# Patient Record
Sex: FEMALE | Race: WHITE | NOT HISPANIC OR LATINO | Employment: OTHER | URBAN - METROPOLITAN AREA
[De-identification: names, ages, dates, MRNs, and addresses within clinical notes are randomized per-mention and may not be internally consistent; named-entity substitution may affect disease eponyms.]

---

## 2017-02-09 ENCOUNTER — TELEPHONE (OUTPATIENT)
Dept: FAMILY MEDICINE | Facility: CLINIC | Age: 82
End: 2017-02-09

## 2017-02-09 DIAGNOSIS — E87.1 HYPONATREMIA: ICD-10-CM

## 2017-02-09 DIAGNOSIS — M81.0 OSTEOPOROSIS: Primary | ICD-10-CM

## 2017-02-09 NOTE — TELEPHONE ENCOUNTER
----- Message from Lalita Conway RN sent at 2/7/2017  2:45 PM CST -----  Regarding: Prolia  If you would like this pt to receive Prolia, please set up a CMP and reorder Prolia, so a referral will be created, and I will schedule pt after making sure her Ca level is WNL.    Let me know.    Thanks,  Lalita Valdovinos

## 2017-02-24 ENCOUNTER — DOCUMENTATION ONLY (OUTPATIENT)
Dept: INFUSION THERAPY | Facility: HOSPITAL | Age: 82
End: 2017-02-24

## 2017-02-24 ENCOUNTER — TELEPHONE (OUTPATIENT)
Dept: FAMILY MEDICINE | Facility: CLINIC | Age: 82
End: 2017-02-24

## 2017-02-24 NOTE — TELEPHONE ENCOUNTER
Called patient to schedule labs so she can receive prolia. She stated she will call back once she speaks with her daughters in order to see which date they are available to bring her

## 2017-02-24 NOTE — PROGRESS NOTES
Pt did not show for her lab appt, per Melissa Paz in MD office. She rescheduled pt's labs for 2/27/2017. If Ca level is WNL will call to schedule Prolia.

## 2017-03-01 ENCOUNTER — LAB VISIT (OUTPATIENT)
Dept: LAB | Facility: HOSPITAL | Age: 82
End: 2017-03-01
Attending: FAMILY MEDICINE
Payer: MEDICARE

## 2017-03-01 ENCOUNTER — TELEPHONE (OUTPATIENT)
Dept: FAMILY MEDICINE | Facility: CLINIC | Age: 82
End: 2017-03-01

## 2017-03-01 DIAGNOSIS — E87.1 HYPONATREMIA: ICD-10-CM

## 2017-03-01 DIAGNOSIS — M81.0 OSTEOPOROSIS: ICD-10-CM

## 2017-03-01 LAB
ALBUMIN SERPL BCP-MCNC: 2.6 G/DL
ALP SERPL-CCNC: 86 U/L
ALT SERPL W/O P-5'-P-CCNC: 29 U/L
ANION GAP SERPL CALC-SCNC: 10 MMOL/L
AST SERPL-CCNC: 37 U/L
BILIRUB SERPL-MCNC: 0.3 MG/DL
BUN SERPL-MCNC: 13 MG/DL
CALCIUM SERPL-MCNC: 9.6 MG/DL
CHLORIDE SERPL-SCNC: 98 MMOL/L
CO2 SERPL-SCNC: 25 MMOL/L
CREAT SERPL-MCNC: 0.8 MG/DL
EST. GFR  (AFRICAN AMERICAN): >60 ML/MIN/1.73 M^2
EST. GFR  (NON AFRICAN AMERICAN): >60 ML/MIN/1.73 M^2
GLUCOSE SERPL-MCNC: 105 MG/DL
POTASSIUM SERPL-SCNC: 4.3 MMOL/L
PROT SERPL-MCNC: 7.6 G/DL
SODIUM SERPL-SCNC: 133 MMOL/L

## 2017-03-01 PROCEDURE — 36415 COLL VENOUS BLD VENIPUNCTURE: CPT

## 2017-03-01 PROCEDURE — 80053 COMPREHEN METABOLIC PANEL: CPT

## 2017-03-01 NOTE — TELEPHONE ENCOUNTER
----- Message from Irma Soto sent at 3/1/2017 10:15 AM CST -----  Contact: Self [phone] or [phone]  Calling to get Lab orders. Please advice

## 2017-03-07 ENCOUNTER — TELEPHONE (OUTPATIENT)
Dept: INFUSION THERAPY | Facility: HOSPITAL | Age: 82
End: 2017-03-07

## 2017-03-10 ENCOUNTER — OFFICE VISIT (OUTPATIENT)
Dept: FAMILY MEDICINE | Facility: CLINIC | Age: 82
End: 2017-03-10
Payer: MEDICARE

## 2017-03-10 VITALS
BODY MASS INDEX: 22.3 KG/M2 | SYSTOLIC BLOOD PRESSURE: 98 MMHG | DIASTOLIC BLOOD PRESSURE: 68 MMHG | TEMPERATURE: 98 F | WEIGHT: 114.19 LBS | RESPIRATION RATE: 18 BRPM | OXYGEN SATURATION: 99 % | HEART RATE: 75 BPM

## 2017-03-10 DIAGNOSIS — R39.89 URINE TROUBLES: ICD-10-CM

## 2017-03-10 DIAGNOSIS — N39.0 URINARY TRACT INFECTION WITHOUT HEMATURIA, SITE UNSPECIFIED: Primary | ICD-10-CM

## 2017-03-10 LAB
BILIRUB SERPL-MCNC: NEGATIVE MG/DL
BLOOD, POC UA: NEGATIVE
GLUCOSE UR QL STRIP: NEGATIVE
KETONES UR QL STRIP: NEGATIVE
LEUKOCYTE ESTERASE URINE, POC: ABNORMAL
NITRITE, POC UA: POSITIVE
PH, POC UA: 6
PROTEIN, POC: 30
SPECIFIC GRAVITY, POC UA: >=1.03
UROBILINOGEN, POC UA: 0.2

## 2017-03-10 PROCEDURE — 1125F AMNT PAIN NOTED PAIN PRSNT: CPT | Mod: S$GLB,,, | Performed by: FAMILY MEDICINE

## 2017-03-10 PROCEDURE — 99999 PR PBB SHADOW E&M-EST. PATIENT-LVL III: CPT | Mod: PBBFAC,,, | Performed by: FAMILY MEDICINE

## 2017-03-10 PROCEDURE — 1159F MED LIST DOCD IN RCRD: CPT | Mod: S$GLB,,, | Performed by: FAMILY MEDICINE

## 2017-03-10 PROCEDURE — 81000 URINALYSIS NONAUTO W/SCOPE: CPT | Mod: S$GLB,,, | Performed by: FAMILY MEDICINE

## 2017-03-10 PROCEDURE — 1160F RVW MEDS BY RX/DR IN RCRD: CPT | Mod: S$GLB,,, | Performed by: FAMILY MEDICINE

## 2017-03-10 PROCEDURE — 87086 URINE CULTURE/COLONY COUNT: CPT

## 2017-03-10 PROCEDURE — 1157F ADVNC CARE PLAN IN RCRD: CPT | Mod: S$GLB,,, | Performed by: FAMILY MEDICINE

## 2017-03-10 PROCEDURE — 99214 OFFICE O/P EST MOD 30 MIN: CPT | Mod: 25,S$GLB,, | Performed by: FAMILY MEDICINE

## 2017-03-10 RX ORDER — NITROFURANTOIN (MACROCRYSTALS) 100 MG/1
100 CAPSULE ORAL EVERY 12 HOURS
Qty: 20 CAPSULE | Refills: 0 | Status: SHIPPED | OUTPATIENT
Start: 2017-03-10 | End: 2017-03-20

## 2017-03-10 NOTE — MR AVS SNAPSHOT
[address]  [place] [address]  Phone: [phone]  Fax: [phone]                  Lisa Olguin   3/10/2017 9:40 AM   Office Visit    Description:  Female : 1922   Provider:  Amber Faustin MD   Department:  [facility]           Reason for Visit     Hematuria                To Do List           Future Appointments        Provider Department Dept Phone    3/24/2017 4:00 PM Amber Faustin MD [facility] [phone]      Goals (5 Years of Data)     None      [facility]     [facility] On [facility] Care Line -  Assistance  Registered nurses in the [facility] On Call Center provide clinical advisement, health education, appointment booking, and other advisory services.  Call for this free service at [phone].             Medications           Message regarding Medications     Verify the changes and/or additions to your medication regime listed below are the same as discussed with your clinician today.  If any of these changes or additions are incorrect, please notify your healthcare provider.             Verify that the below list of medications is an accurate representation of the medications you are currently taking.  If none reported, the list may be blank. If incorrect, please contact your healthcare provider. Carry this list with you in case of emergency.           Current Medications     amlodipine (NORVASC) 10 MG tablet Take 1 tablet (10 mg total) by mouth once daily.    carvedilol (COREG) 3.125 MG tablet Take 1 tablet (3.125 mg total) by mouth 2 (two) times daily.    levothyroxine (SYNTHROID) 75 MCG tablet TAKE 1 TABLET ONE TIME DAILY    lisinopril (PRINIVIL,ZESTRIL) 40 MG tablet Take 1 tablet (40 mg total) by mouth once daily.    lovastatin (MEVACOR) 40 MG tablet Take 1 tablet (40 mg total) by mouth nightly.           Clinical Reference Information           Your Vitals Were     BP Pulse Temp Resp Weight  SpO2    98/68 (BP Location: Left arm, Patient Position: Sitting, BP Method: Manual) 75 97.8 °F (36.6 °C) (Oral) 18 51.8 kg (114 lb 3.2 oz) 99%    BMI                22.3 kg/m2          Blood Pressure          Most Recent Value    BP  98/68      Allergies as of 3/10/2017     Fosamax [Alendronate]    Neosporin [Benzalkonium Chloride]    Pcn [Penicillins]    Sulfa (Sulfonamide Antibiotics)    Etodolac      Immunizations Administered on Date of Encounter - 3/10/2017     None      [facility] Sign-Up     Activating your [facility] account is as easy as 1-2-3!     1) Visit [email or web address], select Sign Up Now, enter this activation code and your date of birth, then select Next.  [email or web address]  Expires: 4/24/2017 10:45 AM      2) Create a username and password to use when you visit [facility] in the future and select a security question in case you lose your password and select Next.    3) Enter your e-mail address and click Sign Up!    Additional Information  If you have questions, please e-mail [email or web address] or call [phone] to talk to our [facility] staff. Remember, [facility] is NOT to be used for urgent needs. For medical emergencies, dial 911.         Language Assistance Services     ATTENTION: Language assistance services are available, free of charge. Please call [phone].      ATENCIÓN: Si habla español, tiene a roberson disposición servicios gratuitos de asistencia lingüística. Llame al [phone].     CHÚ Ý: N?u b?n nói Ti?ng Vi?t, có các d?ch v? h? tr? ngôn ng? mi?n phí dành cho b?n. G?i s? [phone].         [facility] complies with applicable Federal civil rights laws and does not discriminate on the basis of race, color, national origin, age, disability, or sex.

## 2017-03-12 LAB — BACTERIA UR CULT: NO GROWTH

## 2017-03-13 NOTE — PROGRESS NOTES
HPI:  Lisa Olguin is a 89 y.o. year old female that  presents with   Chief Complaint   Patient presents with    Hematuria     4 days   .     HPI      Past Medical History:   Diagnosis Date    Basal cell carcinoma 2012    nose    HTN (hypertension)     Hyperlipidemia     Inflammatory arthritis     Joint pain     Osteoarthritis     Osteoporosis, unspecified     Polymyalgia rheumatica     followed by rheumatology     Social History     Social History    Marital status:      Spouse name: N/A    Number of children: N/A    Years of education: N/A     Occupational History    Not on file.     Social History Main Topics    Smoking status: Former Smoker    Smokeless tobacco: Not on file    Alcohol use Yes      Comment: occasional    Drug use: No    Sexual activity: No     Other Topics Concern    Are You Pregnant Or Think You May Be? No    Breast-Feeding No     Social History Narrative     Past Surgical History:   Procedure Laterality Date    TONSILLECTOMY       Family History   Problem Relation Age of Onset    Hypertension Mother     Heart failure Mother     Heart attack Father     Cancer Sister     Diabetes Sister     Diabetes Brother     Heart disease Brother     Hypertension Daughter     Hypertension Son     Hyperlipidemia Son     Cancer Sister     Lung cancer Sister     Hypertension Daughter     Hyperlipidemia Daughter     Hypertension Daughter     Hypertension Daughter     Melanoma Neg Hx            Review of Systems  General ROS: negative for chills, fever or weight loss  ENT ROS: negative for epistaxis, sore throat or rhinorrhea  Respiratory ROS: no cough, shortness of breath, or wheezing  Cardiovascular ROS: no chest pain or dyspnea on exertion  Gastrointestinal ROS: no abdominal pain, change in bowel habits, or black/ bloody stools    Physical Exam:  BP 98/68 (BP Location: Left arm, Patient Position: Sitting, BP Method: Manual)  Pulse 75  Temp 97.8 °F (36.6 °C)  (Oral)   Resp 18  Wt 51.8 kg (114 lb 3.2 oz)  SpO2 99%  BMI 22.3 kg/m2  General appearance: alert, cooperative, no distress  Constitutional:Oriented to person, place, and time.appears well-developed and well-nourished.  HEENT: Normocephalic, atraumatic, neck symmetrical, no nasal discharge, TM- clear bilaterally  Lungs: clear to auscultation bilaterally, no dullness to percussion bilaterally  Heart: regular rate and rhythm without rub; no displacement of the PMI , S1&S2 present  Abdomen: soft, non-tender; bowel sounds normoactive; no organomegaly      LABS:    Complete Blood Count  Lab Results   Component Value Date    RBC 4.25 07/14/2016    HGB 12.9 07/14/2016    HCT 38.4 07/14/2016    MCV 90 07/14/2016    MCH 30.4 07/14/2016    MCHC 33.6 07/14/2016    RDW 12.8 07/14/2016     07/14/2016    MPV 10.1 07/14/2016    GRAN 4.2 07/14/2016    GRAN 57.4 07/14/2016    LYMPH 2.2 07/14/2016    LYMPH 30.2 07/14/2016    MONO 0.7 07/14/2016    MONO 9.0 07/14/2016    EOS 0.2 07/14/2016    BASO 0.04 07/14/2016    EOSINOPHIL 2.2 07/14/2016    BASOPHIL 0.6 07/14/2016    DIFFMETHOD Automated 07/14/2016       Comprehensive Metabolic Panel  Lab Results   Component Value Date     03/01/2017    BUN 13 03/01/2017    CREATININE 0.8 03/01/2017     (L) 03/01/2017    K 4.3 03/01/2017    CL 98 03/01/2017    PROT 7.6 03/01/2017    ALBUMIN 2.6 (L) 03/01/2017    BILITOT 0.3 03/01/2017    AST 37 03/01/2017    ALKPHOS 86 03/01/2017    CO2 25 03/01/2017    ALT 29 03/01/2017    ANIONGAP 10 03/01/2017    EGFRNONAA >60 03/01/2017    ESTGFRAFRICA >60 03/01/2017       TSH  Lab Results   Component Value Date    TSH 1.882 07/08/2016         Assessment:    ICD-10-CM ICD-9-CM    1. Urinary tract infection without hematuria, site unspecified N39.0 599.0 nitrofurantoin (MACRODANTIN) 100 MG capsule      CULTURE, URINE      CULTURE, URINE   2. Urine troubles R39.89 V47.4 POCT URINALYSIS         Plan:    No Follow-up on  file.          Amber Faustin MD

## 2017-03-16 ENCOUNTER — TELEPHONE (OUTPATIENT)
Dept: FAMILY MEDICINE | Facility: CLINIC | Age: 82
End: 2017-03-16

## 2017-03-16 NOTE — TELEPHONE ENCOUNTER
----- Message from Krissy Whiting sent at 3/16/2017  9:34 AM CDT -----  Contact: ms mosqueda with the  [facility] [phone]  Please call mr. Mosqueda regarding patient

## 2017-03-21 ENCOUNTER — TELEPHONE (OUTPATIENT)
Dept: FAMILY MEDICINE | Facility: CLINIC | Age: 82
End: 2017-03-21

## 2017-03-21 NOTE — TELEPHONE ENCOUNTER
----- Message from Ida Flowers sent at 3/21/2017  8:45 AM CDT -----  Contact: [facility]   [phone]  She states the pt urine spelled from the cup so they wont be able to have results.  Tried calling ext and IM message no answer   Please advise

## 2017-03-21 NOTE — TELEPHONE ENCOUNTER
Urine was sent to lab on 3/10. 3/21 Mirtha from [facility] labs states urine was spilled and patient needs to redo lab specimen. No IM message or missed call from call center or Mirtha at [facility].

## 2017-03-24 ENCOUNTER — TELEPHONE (OUTPATIENT)
Dept: FAMILY MEDICINE | Facility: CLINIC | Age: 82
End: 2017-03-24

## 2017-03-24 ENCOUNTER — OFFICE VISIT (OUTPATIENT)
Dept: FAMILY MEDICINE | Facility: CLINIC | Age: 82
End: 2017-03-24
Payer: MEDICARE

## 2017-03-24 VITALS
TEMPERATURE: 98 F | SYSTOLIC BLOOD PRESSURE: 162 MMHG | HEART RATE: 80 BPM | OXYGEN SATURATION: 99 % | RESPIRATION RATE: 18 BRPM | DIASTOLIC BLOOD PRESSURE: 68 MMHG | WEIGHT: 112.13 LBS | BODY MASS INDEX: 21.89 KG/M2

## 2017-03-24 DIAGNOSIS — E03.9 HYPOTHYROIDISM, UNSPECIFIED TYPE: ICD-10-CM

## 2017-03-24 DIAGNOSIS — R53.83 FATIGUE, UNSPECIFIED TYPE: ICD-10-CM

## 2017-03-24 DIAGNOSIS — R19.00 ABDOMINAL MASS, UNSPECIFIED LOCATION: ICD-10-CM

## 2017-03-24 DIAGNOSIS — R09.89 ABDOMINAL BRUIT: Primary | ICD-10-CM

## 2017-03-24 DIAGNOSIS — I10 ESSENTIAL HYPERTENSION: ICD-10-CM

## 2017-03-24 PROCEDURE — 99999 PR PBB SHADOW E&M-EST. PATIENT-LVL IV: CPT | Mod: PBBFAC,,, | Performed by: FAMILY MEDICINE

## 2017-03-24 PROCEDURE — 1157F ADVNC CARE PLAN IN RCRD: CPT | Mod: S$GLB,,, | Performed by: FAMILY MEDICINE

## 2017-03-24 PROCEDURE — 99499 UNLISTED E&M SERVICE: CPT | Mod: S$GLB,,, | Performed by: FAMILY MEDICINE

## 2017-03-24 PROCEDURE — 99215 OFFICE O/P EST HI 40 MIN: CPT | Mod: S$GLB,,, | Performed by: FAMILY MEDICINE

## 2017-03-24 PROCEDURE — 1159F MED LIST DOCD IN RCRD: CPT | Mod: S$GLB,,, | Performed by: FAMILY MEDICINE

## 2017-03-24 PROCEDURE — 1125F AMNT PAIN NOTED PAIN PRSNT: CPT | Mod: S$GLB,,, | Performed by: FAMILY MEDICINE

## 2017-03-24 PROCEDURE — 1160F RVW MEDS BY RX/DR IN RCRD: CPT | Mod: S$GLB,,, | Performed by: FAMILY MEDICINE

## 2017-03-24 RX ORDER — HYDROCODONE BITARTRATE AND ACETAMINOPHEN 10; 325 MG/1; MG/1
1 TABLET ORAL EVERY 6 HOURS PRN
Qty: 90 TABLET | Refills: 0 | Status: SHIPPED | OUTPATIENT
Start: 2017-03-24 | End: 2017-04-12 | Stop reason: SDUPTHER

## 2017-03-24 NOTE — TELEPHONE ENCOUNTER
----- Message from Julissa Faustin sent at 3/24/2017  3:23 PM CDT -----  Contact: [phone]  Pt requesting the results of her testing/scan done at [facility]. Please advise.

## 2017-03-24 NOTE — MR AVS SNAPSHOT
[address]  [place] [address]  Phone: [phone]  Fax: [phone]                  Lisa Olguin   3/24/2017 8:40 AM   Office Visit    Description:  Female : 1922   Provider:  Amber Faustin MD   Department:  [facility]           Reason for Visit     Hip Pain     Fatigue                To Do List           Goals (5 Years of Data)     None      [facility]     [facility] On Call Nurse Care Line -  Assistance  Registered nurses in the [facility] On Call Center provide clinical advisement, health education, appointment booking, and other advisory services.  Call for this free service at [phone].             Medications           Message regarding Medications     Verify the changes and/or additions to your medication regime listed below are the same as discussed with your clinician today.  If any of these changes or additions are incorrect, please notify your healthcare provider.             Verify that the below list of medications is an accurate representation of the medications you are currently taking.  If none reported, the list may be blank. If incorrect, please contact your healthcare provider. Carry this list with you in case of emergency.           Current Medications     amlodipine (NORVASC) 10 MG tablet Take 1 tablet (10 mg total) by mouth once daily.    carvedilol (COREG) 3.125 MG tablet Take 1 tablet (3.125 mg total) by mouth 2 (two) times daily.    levothyroxine (SYNTHROID) 75 MCG tablet TAKE 1 TABLET ONE TIME DAILY    lisinopril (PRINIVIL,ZESTRIL) 40 MG tablet Take 1 tablet (40 mg total) by mouth once daily.    lovastatin (MEVACOR) 40 MG tablet Take 1 tablet (40 mg total) by mouth nightly.           Clinical Reference Information           Your Vitals Were     BP Pulse Temp Resp Weight SpO2    162/68 (BP Location: Left arm, Patient Position: Sitting, BP Method: Manual) 80 98 °F (36.7 °C) (Oral) 18 50.8 kg (112 lb 1.7 oz)  99%    BMI                21.89 kg/m2          Blood Pressure          Most Recent Value    BP  (!)  162/68      Allergies as of 3/24/2017     Fosamax [Alendronate]    Neosporin [Benzalkonium Chloride]    Pcn [Penicillins]    Sulfa (Sulfonamide Antibiotics)    Etodolac      Immunizations Administered on Date of Encounter - 3/24/2017     None      [facility] Sign-Up     Activating your [facility] account is as easy as 1-2-3!     1) Visit [email or web address], select Sign Up Now, enter this activation code and your date of birth, then select Next.  [email or web address]  Expires: 4/24/2017 11:45 AM      2) Create a username and password to use when you visit [facility] in the future and select a security question in case you lose your password and select Next.    3) Enter your e-mail address and click Sign Up!    Additional Information  If you have questions, please e-mail [email or web address] or call [phone] to talk to our [facility] staff. Remember, [facility] is NOT to be used for urgent needs. For medical emergencies, dial 911.         Language Assistance Services     ATTENTION: Language assistance services are available, free of charge. Please call [phone].      ATENCIÓN: Si habla español, tiene a roberson disposición servicios gratuitos de asistencia lingüística. Llame al [phone].     CHÚ Ý: N?u b?n nói Ti?ng Vi?t, có các d?ch v? h? tr? ngôn ng? mi?n phí dành cho b?n. G?i s? [phone].         [facility] complies with applicable Federal civil rights laws and does not discriminate on the basis of race, color, national origin, age, disability, or sex.

## 2017-03-27 ENCOUNTER — TELEPHONE (OUTPATIENT)
Dept: FAMILY MEDICINE | Facility: CLINIC | Age: 82
End: 2017-03-27

## 2017-03-27 NOTE — PROGRESS NOTES
HPI:  Lisa Olguin is a 89 y.o. year old female that  presents with continued complaint of left sided flank pain and weakening . She is walking with a walker today. She is with another one of her daughter who reports that it has been difficult to get their mother to eat.She ook her BP meds but BP has elevated. She has previous hx of similar happening but it was never discovered why.  Chief Complaint   Patient presents with    Hip Pain    Fatigue   .     HPI    Past Medical History:   Diagnosis Date    Basal cell carcinoma 2012    nose    HTN (hypertension)     Hyperlipidemia     Inflammatory arthritis     Joint pain     Osteoarthritis     Osteoporosis, unspecified     Polymyalgia rheumatica     followed by rheumatology     Social History     Social History    Marital status:      Spouse name: N/A    Number of children: N/A    Years of education: N/A     Occupational History    Not on file.     Social History Main Topics    Smoking status: Former Smoker    Smokeless tobacco: Not on file    Alcohol use Yes      Comment: occasional    Drug use: No    Sexual activity: No     Other Topics Concern    Are You Pregnant Or Think You May Be? No    Breast-Feeding No     Social History Narrative     Past Surgical History:   Procedure Laterality Date    TONSILLECTOMY       Family History   Problem Relation Age of Onset    Hypertension Mother     Heart failure Mother     Heart attack Father     Cancer Sister     Diabetes Sister     Diabetes Brother     Heart disease Brother     Hypertension Daughter     Hypertension Son     Hyperlipidemia Son     Cancer Sister     Lung cancer Sister     Hypertension Daughter     Hyperlipidemia Daughter     Hypertension Daughter     Hypertension Daughter     Melanoma Neg Hx            Review of Systems  General ROS: negative for chills, fever or weight loss  Psychological ROS: negative for hallucination, depression or suicidal ideation  Ophthalmic  ROS: negative for blurry vision, photophobia or eye pain  ENT ROS: negative for epistaxis, sore throat or rhinorrhea  Respiratory ROS: no cough, shortness of breath, or wheezing  Cardiovascular ROS: no chest pain or dyspnea on exertion  Gastrointestinal ROS: no abdominal pain, change in bowel habits, or black/ bloody stools  Genito-Urinary ROS: no dysuria, trouble voiding, or hematuria  Musculoskeletal ROS: pos for gait disturbance and muscular weakness, left flank pain  Neurological ROS: no syncope or seizures; no ataxia  Dermatological ROS: negative for pruritis, rash and jaundice      Physical Exam:  BP (!) 162/68 (BP Location: Left arm, Patient Position: Sitting, BP Method: Manual)  Pulse 80  Temp 98 °F (36.7 °C) (Oral)   Resp 18  Wt 50.8 kg (112 lb 1.7 oz)  SpO2 99%  BMI 21.89 kg/m2  General appearance: alert, cooperative, no distress  Constitutional:Oriented to person, place, and time.appears well-developed and well-nourished.  HEENT: Normocephalic, atraumatic, neck symmetrical, no nasal discharge, TM - clear bilaterally   Eyes: conjunctivae/corneas clear, PERRL, EOM's intact  Lungs: clear to auscultation bilaterally, no dullness to percussion bilaterally  Heart: regular rate and rhythm without rub; no displacement of the PMI   Abdomen: soft, leftUQ tenderness; bowel sounds normoactive; no organomegaly, pos abd bruit  Extremities: extremities symmetric; no clubbing, cyanosis, or edema  Integument: Skin color, texture, turgor normal; no rashes; hair distrubution normal  Neurologic: Alert and oriented X 3, normal strength, normal coordination and gait  Psychiatric: no pressured speech; normal affect; no evidence of impaired cognition   Physical Exam  LABS:    Complete Blood Count  Lab Results   Component Value Date    RBC 4.25 07/14/2016    HGB 12.9 07/14/2016    HCT 38.4 07/14/2016    MCV 90 07/14/2016    MCH 30.4 07/14/2016    MCHC 33.6 07/14/2016    RDW 12.8 07/14/2016     07/14/2016    MPV 10.1  07/14/2016    GRAN 4.2 07/14/2016    GRAN 57.4 07/14/2016    LYMPH 2.2 07/14/2016    LYMPH 30.2 07/14/2016    MONO 0.7 07/14/2016    MONO 9.0 07/14/2016    EOS 0.2 07/14/2016    BASO 0.04 07/14/2016    EOSINOPHIL 2.2 07/14/2016    BASOPHIL 0.6 07/14/2016    DIFFMETHOD Automated 07/14/2016       Comprehensive Metabolic Panel  Lab Results   Component Value Date     03/01/2017    BUN 13 03/01/2017    CREATININE 0.62 03/24/2017     (L) 03/01/2017    K 4.3 03/01/2017    CL 98 03/01/2017    PROT 7.6 03/01/2017    ALBUMIN 2.6 (L) 03/01/2017    BILITOT 0.3 03/01/2017    AST 37 03/01/2017    ALKPHOS 86 03/01/2017    CO2 25 03/01/2017    ALT 29 03/01/2017    ANIONGAP 10 03/01/2017    EGFRNONAA >60.0 03/24/2017    ESTGFRAFRICA >60.0 03/24/2017       LIPID  No components found for: LIPIDPANEL    TSH  Lab Results   Component Value Date    TSH 1.882 07/08/2016         Assessment:    ICD-10-CM ICD-9-CM    1. Abdominal bruit R09.89 785.9 CT Abdomen With Contrast      Creatinine, serum   2. Essential hypertension I10 401.9 CT Abdomen With Contrast      Creatinine, serum      Comprehensive metabolic panel      CBC auto differential      Ambulatory Referral to Oncology   3. Hypothyroidism, unspecified type E03.9 244.9    4. Fatigue, unspecified type R53.83 780.79 Comprehensive metabolic panel      CBC auto differential      Ambulatory Referral to Oncology   5. Abdominal mass, unspecified location R19.00 789.30 hydrocodone-acetaminophen 10-325mg (NORCO)  mg Tab      Ambulatory Referral to Oncology         Plan:  Abn U/S done today reveals a paraspinal mass suspicious for malignancy along with an adrenal mass  Return in 1 week (on 3/31/2017).          Amber Faustin MD

## 2017-03-27 NOTE — TELEPHONE ENCOUNTER
----- Message from Ophelia Stubbs sent at 3/27/2017 10:16 AM CDT -----  Contact: Aria(daughter)/[phone]  Patient's daughter would like to speak with you about getting a referral to hospice because her mother has gotten progressively worse and she is concerned she won't be able to get her to an appointment.  Please advise

## 2017-03-28 ENCOUNTER — DOCUMENTATION ONLY (OUTPATIENT)
Dept: HEMATOLOGY/ONCOLOGY | Facility: CLINIC | Age: 82
End: 2017-03-28

## 2017-03-28 ENCOUNTER — INITIAL CONSULT (OUTPATIENT)
Dept: HEMATOLOGY/ONCOLOGY | Facility: CLINIC | Age: 82
End: 2017-03-28
Payer: MEDICARE

## 2017-03-28 ENCOUNTER — INITIAL CONSULT (OUTPATIENT)
Dept: RADIATION ONCOLOGY | Facility: CLINIC | Age: 82
End: 2017-03-28
Payer: MEDICARE

## 2017-03-28 VITALS
DIASTOLIC BLOOD PRESSURE: 64 MMHG | TEMPERATURE: 99 F | OXYGEN SATURATION: 93 % | HEART RATE: 71 BPM | HEIGHT: 60 IN | SYSTOLIC BLOOD PRESSURE: 114 MMHG

## 2017-03-28 VITALS
RESPIRATION RATE: 20 BRPM | SYSTOLIC BLOOD PRESSURE: 138 MMHG | DIASTOLIC BLOOD PRESSURE: 65 MMHG | HEART RATE: 67 BPM | TEMPERATURE: 98 F

## 2017-03-28 DIAGNOSIS — C80.1 CANCER WITH UNKNOWN PRIMARY SITE: ICD-10-CM

## 2017-03-28 DIAGNOSIS — C80.1 PRIMARY CANCER OF UNKNOWN SITE AND CELL TYPE: ICD-10-CM

## 2017-03-28 DIAGNOSIS — K59.00 CONSTIPATION, UNSPECIFIED CONSTIPATION TYPE: Primary | ICD-10-CM

## 2017-03-28 DIAGNOSIS — G89.3 NEOPLASM RELATED PAIN: Primary | ICD-10-CM

## 2017-03-28 DIAGNOSIS — S24.153A: ICD-10-CM

## 2017-03-28 DIAGNOSIS — C78.89: Primary | ICD-10-CM

## 2017-03-28 DIAGNOSIS — G89.3 NEOPLASM RELATED PAIN: ICD-10-CM

## 2017-03-28 PROCEDURE — 99205 OFFICE O/P NEW HI 60 MIN: CPT | Mod: S$GLB,,, | Performed by: INTERNAL MEDICINE

## 2017-03-28 PROCEDURE — 1159F MED LIST DOCD IN RCRD: CPT | Mod: S$GLB,,, | Performed by: RADIOLOGY

## 2017-03-28 PROCEDURE — 99499 UNLISTED E&M SERVICE: CPT | Mod: S$GLB,,, | Performed by: RADIOLOGY

## 2017-03-28 PROCEDURE — 99999 PR PBB SHADOW E&M-EST. PATIENT-LVL III: CPT | Mod: PBBFAC,,, | Performed by: INTERNAL MEDICINE

## 2017-03-28 PROCEDURE — 1125F AMNT PAIN NOTED PAIN PRSNT: CPT | Mod: S$GLB,,, | Performed by: RADIOLOGY

## 2017-03-28 PROCEDURE — 1159F MED LIST DOCD IN RCRD: CPT | Mod: S$GLB,,, | Performed by: INTERNAL MEDICINE

## 2017-03-28 PROCEDURE — 1160F RVW MEDS BY RX/DR IN RCRD: CPT | Mod: S$GLB,,, | Performed by: INTERNAL MEDICINE

## 2017-03-28 PROCEDURE — 99499 UNLISTED E&M SERVICE: CPT | Mod: S$GLB,,, | Performed by: INTERNAL MEDICINE

## 2017-03-28 PROCEDURE — 99999 PR PBB SHADOW E&M-EST. PATIENT-LVL III: CPT | Mod: PBBFAC,,, | Performed by: RADIOLOGY

## 2017-03-28 PROCEDURE — 1160F RVW MEDS BY RX/DR IN RCRD: CPT | Mod: S$GLB,,, | Performed by: RADIOLOGY

## 2017-03-28 PROCEDURE — 1157F ADVNC CARE PLAN IN RCRD: CPT | Mod: S$GLB,,, | Performed by: RADIOLOGY

## 2017-03-28 PROCEDURE — 1157F ADVNC CARE PLAN IN RCRD: CPT | Mod: S$GLB,,, | Performed by: INTERNAL MEDICINE

## 2017-03-28 PROCEDURE — 1126F AMNT PAIN NOTED NONE PRSNT: CPT | Mod: S$GLB,,, | Performed by: INTERNAL MEDICINE

## 2017-03-28 PROCEDURE — 99204 OFFICE O/P NEW MOD 45 MIN: CPT | Mod: S$GLB,,, | Performed by: RADIOLOGY

## 2017-03-28 RX ORDER — LACTULOSE 10 G/15ML
10 SOLUTION ORAL 4 TIMES DAILY PRN
Qty: 500 ML | Refills: 3 | Status: SHIPPED | OUTPATIENT
Start: 2017-03-28

## 2017-03-28 RX ORDER — POLYETHYLENE GLYCOL 3350 17 G/17G
17 POWDER, FOR SOLUTION ORAL DAILY
COMMUNITY

## 2017-03-28 RX ORDER — DEXAMETHASONE 4 MG/1
4 TABLET ORAL EVERY 12 HOURS
Qty: 60 TABLET | Refills: 0 | Status: SHIPPED | OUTPATIENT
Start: 2017-03-28

## 2017-03-28 NOTE — LETTER
March 28, 2017      Amber Faustin MD  [address]  [address]  [place] [address]           [facility]  [address]  [place] [address]  Phone: [phone]          Patient: Lisa Olguin   MR Number: [id number]   YOB: 1922   Date of Visit: 3/28/2017       Dear Dr. Amber Faustin:    Thank you for referring Lisa Olguin to me for evaluation. Attached you will find relevant portions of my assessment and plan of care.    If you have questions, please do not hesitate to call me. I look forward to following Lisa Olguin along with you.    Sincerely,    Dakota Blank MD    Enclosure  CC:  No Recipients    If you would like to receive this communication electronically, please contact [email or web address] or [phone] to request more information on [facility] access.    For providers and/or their staff who would like to refer a patient to [facility], please contact us through our one-stop-shop provider referral line, [facility], at [phone].    If you feel you have received this communication in error or would no longer like to receive these types of communications, please e-mail [email or web address]

## 2017-03-28 NOTE — PROGRESS NOTES
REFERRING PHYSICIAN; Dakota Blank MD    PROBLEM: Mrs Olguin is a 89 years old woman with recent finding of an as yet un biopsied  metastatic malignancy involving multiple skeletal sites and with loss of strength and sensation in the lower extremities over the past 3 days from apparent cord compression at the T10 level, and who refuseS treatment and further evaluation.     OTHER MEDICAL HISTORY: Patient is a former smoker. She has had a basal cell carcinoma exicised from the tip of the nose. She is treated or followed for hypertension, hyperlipidemia, inflammatory and osteo arthritis and polymyalgia rheumatica. PS is ECOG 2.     PRESENT ILLNESS: Patent was referred to see Dr Blank in oncology clinic earlier today after finding of a left paraspinal mass invading the spinal canal at T10 on a CT of the abdomen on 3/24/17. Patient had been complaining of pain in the left flank for about 3 weeks and had become unable to walk for 2 days. She has a long history of urinary leakage that is unchanged.     The CT on 3/24 shows a left side paraspinal mass at T10 extending to the most cephalad  CT slice and likely further cephalad. It appears to erode the cortex of the vertebral body and to extend into the spinal canal to distort or displace the spinal cord. There are bone lesions in several of the other visualized vertebra, in particular, there is destruction of L2, L4 and L5. There is a 1.8 cm left adrenal mass.     PHYSICAL EXAM: Patient is an alert woman who responds appropriately. She is seen seated in a wheel chair with two daughters present. She is attentive and responds appropriately. There is reported weakness in the lower extremities. She requires assistance to stand from the wheel chair. The remainder of the exam is per Dr Blank earlier today and is unremarkable.     RADIOLOGIC STUDIES: In addition to that noted above, a CT of the chest from 7/13/16 reports no significant abnormality. There are sclerotic and  possibly lytic areas in the vertebra but these were attributed to degenerative disease.     LABORATORY STUDIES: Comprehensive metabolic panel on 3/1/17 is remarkable for an albumin of 2.6.     IMPRESSION: Patient likely has either a carcinoma of unknown primary site or multiple myeloma. The recent onset lower extremity weakness, numbness and the left flank pain indicate spinal cord compression at T10.   Prompt reatment of the thoracic and lumbar spine with radiation is recommended to relieve pain,  prevent vertebral instability and prevent progression of the spinal cord compression symptoms. Recovery of some degree of lower extremity function is possible though not likely.     This recommendation and the logistics and side effects of radiation treatment were explained to Mrs Shanks and her daughters. Despite her understanding the process of radiation treatment, the benefit and the likely consequence of refusing treatment, and despite the urging of her daughters, she steadfastly refuses treatment and any additional medical evaluation.     PLAN: A referral was made to hospice. The daughters understand that if she reconsiders and decides she wants treatment they can call and we will proceed with it. (45 mintues in discussion with patient and family).       ANDENDUM: Patient has reconsidered. She returns for simulation for treatment planning 3/29/17. A course of radiation to the spine to a dose of 30 Gy in fractions of 3 Gy each starts 3/30/17.

## 2017-03-28 NOTE — PROGRESS NOTES
Received referral from clinic to come meet with patient and family at radiation. Family had questions about treatment and hospice care services. Family had questions about transportation for treatments. We reviewed all resources for transport. Patient requests that she only wants to go home and be in her bed. I attempted to process her fears about getting treatment several times but she was only wanted to go home. I offered to have hospice come to her home and speak about the services they would be able to provide her. She is agreeable as well as family. Reached out to Pili with [facility] and requested an informational visit for the patient and family today. I provided her with the daughter, Nori, phone number. Pili contacted me back to inform me that she will be meeting with the family today at 3:00pm

## 2017-03-28 NOTE — LETTER
March 28, 2017      Dakota Blank MD  [address] [place] [address]           [facility] - Radiation Oncology  [address]  [place] [address]  Phone: [phone]          Patient: Lisa Olguin   MR Number: [id number]   YOB: 1922   Date of Visit: 3/28/2017       Dear Dr. Dakota Blank:    Thank you for referring Lisa Olguin to me for evaluation. Attached you will find relevant portions of my assessment and plan of care.    If you have questions, please do not hesitate to call me. I look forward to following Lisa Olguin along with you.    Sincerely,    Will Batres MD    Enclosure  CC:  No Recipients    If you would like to receive this communication electronically, please contact [email or web address] or [phone] to request more information on [employer] access.    For providers and/or their staff who would like to refer a patient to [facility], please contact us through our one-stop-shop provider referral line, [facility], at [phone].    If you feel you have received this communication in error or would no longer like to receive these types of communications, please e-mail [email or web address]

## 2017-03-28 NOTE — PROGRESS NOTES
Subjective:       Patient ID: Lisa Olguin is a 89 y.o. female.    Chief Complaint: Abnormal CT (ref Dr. CHARLES Faustin)    HPI 89-year-old  female referred by Dr. Faustin for a T10 level paraspinal mass.  Patient accompanied to the clinic by 2 daughters and granddaughter.  She was pretty ambulatory and was able to do basic household stuff until 3 weeks ago when she started to have pain in the back and developed weakness.  She has not been able to walk for the past 2 days and feel her legs are giving way.  She also has some numbness in the lower extremities.  She is in a wheelchair.  She is awake and alert.    CT scan of the abdomen done on 3/24 reveals a soft tissue density mass in the left paraspinal location at T10 measuring 4.1 by 2.6 centimeters with associated bone destruction.  There is marked spinal stenosis at the T10 level.  The findings are concerning for metastatic disease.  There is a 1.8 centimeter left adrenal mass as well.      She is taking Norco 4 tablets a day for pain and is constipated.    Review of Systems   Constitutional: Positive for activity change and fatigue.   HENT: Negative for mouth sores and nosebleeds.    Eyes: Negative for photophobia and pain.   Respiratory: Negative for cough, shortness of breath and stridor.    Musculoskeletal: Positive for back pain and gait problem.   Neurological: Positive for weakness. Negative for seizures and headaches.   Hematological: Negative for adenopathy. Does not bruise/bleed easily.   Psychiatric/Behavioral: Negative for agitation, behavioral problems and confusion. The patient is not nervous/anxious.    All other systems reviewed and are negative.      Objective:      Physical Exam   Constitutional: She is oriented to person, place, and time. She appears well-developed and well-nourished. No distress.   HENT:   Head: Normocephalic and atraumatic.   Right Ear: Tympanic membrane, external ear and ear canal normal.   Left Ear: Tympanic  membrane, external ear and ear canal normal.   Nose: Nose normal. No mucosal edema, sinus tenderness, septal deviation or nasal septal hematoma. No epistaxis. Right sinus exhibits no maxillary sinus tenderness and no frontal sinus tenderness. Left sinus exhibits no maxillary sinus tenderness and no frontal sinus tenderness.   Mouth/Throat: Oropharynx is clear and moist and mucous membranes are normal. Mucous membranes are not cyanotic. No oral lesions. No dental caries. No oropharyngeal exudate.   Hard and soft palate, tongue and posterior pharyngeal wall unremarkable   Eyes: Conjunctivae and lids are normal. No scleral icterus.   Neck: Trachea normal. Neck supple. No tracheal tenderness present. No tracheal deviation present. No thyroid mass (thyroid is non-tender) present.   Cardiovascular: Normal rate, regular rhythm, normal heart sounds, intact distal pulses and normal pulses.  Exam reveals no gallop.    No murmur heard.  No edema   Pulmonary/Chest: Effort normal and breath sounds normal. No accessory muscle usage or stridor. No respiratory distress. She has no decreased breath sounds. She has no wheezes. She has no rhonchi. She has no rales.   Abdominal: Soft. Bowel sounds are normal. She exhibits no distension and no mass. There is no hepatosplenomegaly, splenomegaly or hepatomegaly. There is no tenderness.   Musculoskeletal: She exhibits no edema or tenderness.   Lymphadenopathy:        Head (right side): No submental and no submandibular adenopathy present.        Head (left side): No submental and no submandibular adenopathy present.     She has no cervical adenopathy.     She has no axillary adenopathy.        Right: No supraclavicular adenopathy present.        Left: No supraclavicular adenopathy present.   Neurological: She is alert and oriented to person, place, and time. She is not disoriented. A sensory deficit (she has numbness in both lower extre) is present. No cranial nerve deficit. Gait abnormal.    She has decreased strength in both the lower extremities.  She's not able to get up from the chair without assistance.   Skin: Skin is warm and intact. No petechiae and no rash noted. She is not diaphoretic. No cyanosis. No pallor. Nails show no clubbing.   Psychiatric: She has a normal mood and affect. Her speech is normal and behavior is normal. Judgment and thought content normal. Her mood appears not anxious. She expresses no homicidal and no suicidal ideation.       Assessment:       1. Constipation, unspecified constipation type    2. Other incomplete lesion at T7-t10 level of thoracic spinal cord, initial encounter    3. Neoplasm related pain        Plan:   In summary this is an elderly 89-year-old  female with a T10 paraspinal mass causing back pain and potential early signs of cord compression.  Patient is not interested in further workup at this time.  I suggested they see radiation oncology for an initial consultation and evaluation for palliative radiation therapy.  Patient and family are agreeable to this.  We'll start Decadron 4 milligrams twice a day.  Will also prescribe lactulose for constipation.  After completion of radiation therapy they're interested in enrolling in hospice and I provided them with the required information.  All questions answered.

## 2017-03-29 ENCOUNTER — HOSPITAL ENCOUNTER (OUTPATIENT)
Dept: RADIATION THERAPY | Facility: HOSPITAL | Age: 82
Discharge: HOME | End: 2017-03-29
Attending: RADIOLOGY
Payer: MEDICARE

## 2017-03-29 DIAGNOSIS — G89.3 NEOPLASM RELATED PAIN: Primary | ICD-10-CM

## 2017-03-29 PROCEDURE — 77290 THER RAD SIMULAJ FIELD CPLX: CPT | Mod: TC | Performed by: RADIOLOGY

## 2017-03-29 PROCEDURE — 77263 THER RADIOLOGY TX PLNG CPLX: CPT | Mod: ,,, | Performed by: RADIOLOGY

## 2017-03-29 PROCEDURE — 77014 HC CT GUIDANCE RADIATION THERAPY FLDS PLACEMENT: CPT | Mod: TC | Performed by: RADIOLOGY

## 2017-03-29 PROCEDURE — 77334 RADIATION TREATMENT AID(S): CPT | Mod: 26,,, | Performed by: RADIOLOGY

## 2017-03-29 PROCEDURE — 77334 RADIATION TREATMENT AID(S): CPT | Mod: TC | Performed by: RADIOLOGY

## 2017-03-29 PROCEDURE — 77290 THER RAD SIMULAJ FIELD CPLX: CPT | Mod: 26,,, | Performed by: RADIOLOGY

## 2017-03-30 PROCEDURE — 77295 3-D RADIOTHERAPY PLAN: CPT | Mod: TC | Performed by: RADIOLOGY

## 2017-03-30 PROCEDURE — 77334 RADIATION TREATMENT AID(S): CPT | Mod: TC | Performed by: RADIOLOGY

## 2017-03-30 PROCEDURE — 77300 RADIATION THERAPY DOSE PLAN: CPT | Mod: TC | Performed by: RADIOLOGY

## 2017-03-30 PROCEDURE — 77417 THER RADIOLOGY PORT IMAGE(S): CPT | Performed by: RADIOLOGY

## 2017-03-30 PROCEDURE — 77295 3-D RADIOTHERAPY PLAN: CPT | Mod: 26,,, | Performed by: RADIOLOGY

## 2017-03-30 PROCEDURE — 77334 RADIATION TREATMENT AID(S): CPT | Mod: 26,,, | Performed by: RADIOLOGY

## 2017-03-30 PROCEDURE — 77412 RADIATION TX DELIVERY LVL 3: CPT | Performed by: RADIOLOGY

## 2017-03-30 PROCEDURE — 77300 RADIATION THERAPY DOSE PLAN: CPT | Mod: 26,,, | Performed by: RADIOLOGY

## 2017-03-31 PROCEDURE — 77412 RADIATION TX DELIVERY LVL 3: CPT | Performed by: RADIOLOGY

## 2017-03-31 PROCEDURE — G6002 STEREOSCOPIC X-RAY GUIDANCE: HCPCS | Mod: 26,,, | Performed by: RADIOLOGY

## 2017-03-31 PROCEDURE — 77387 GUIDANCE FOR RADJ TX DLVR: CPT | Mod: TC | Performed by: RADIOLOGY

## 2017-04-02 RX ORDER — LOVASTATIN 40 MG/1
TABLET ORAL
Qty: 90 TABLET | Refills: 3 | Status: SHIPPED | OUTPATIENT
Start: 2017-04-02

## 2017-04-03 ENCOUNTER — HOSPITAL ENCOUNTER (OUTPATIENT)
Dept: RADIATION THERAPY | Facility: HOSPITAL | Age: 82
Discharge: HOME | End: 2017-04-03
Attending: RADIOLOGY
Payer: MEDICARE

## 2017-04-03 PROCEDURE — 77412 RADIATION TX DELIVERY LVL 3: CPT | Performed by: RADIOLOGY

## 2017-04-03 PROCEDURE — G6002 STEREOSCOPIC X-RAY GUIDANCE: HCPCS | Mod: 26,,, | Performed by: RADIOLOGY

## 2017-04-03 PROCEDURE — 77387 GUIDANCE FOR RADJ TX DLVR: CPT | Mod: TC | Performed by: RADIOLOGY

## 2017-04-04 ENCOUNTER — DOCUMENTATION ONLY (OUTPATIENT)
Dept: RADIATION ONCOLOGY | Facility: CLINIC | Age: 82
End: 2017-04-04

## 2017-04-04 PROCEDURE — 77412 RADIATION TX DELIVERY LVL 3: CPT | Performed by: RADIOLOGY

## 2017-04-04 NOTE — PLAN OF CARE
Problem: Patient Care Overview  Goal: Plan of Care Review  Outcome: Ongoing (interventions implemented as appropriate)  Day 4 of radiation to the spine pain improved on decadron

## 2017-04-05 PROCEDURE — 77412 RADIATION TX DELIVERY LVL 3: CPT | Performed by: RADIOLOGY

## 2017-04-06 PROCEDURE — 77417 THER RADIOLOGY PORT IMAGE(S): CPT | Performed by: RADIOLOGY

## 2017-04-06 PROCEDURE — 77336 RADIATION PHYSICS CONSULT: CPT | Performed by: RADIOLOGY

## 2017-04-06 PROCEDURE — 77412 RADIATION TX DELIVERY LVL 3: CPT | Performed by: RADIOLOGY

## 2017-04-07 PROCEDURE — 77412 RADIATION TX DELIVERY LVL 3: CPT | Performed by: RADIOLOGY

## 2017-04-10 PROCEDURE — 77412 RADIATION TX DELIVERY LVL 3: CPT | Performed by: RADIOLOGY

## 2017-04-11 PROCEDURE — 77412 RADIATION TX DELIVERY LVL 3: CPT | Performed by: RADIOLOGY

## 2017-04-12 ENCOUNTER — DOCUMENTATION ONLY (OUTPATIENT)
Dept: RADIATION ONCOLOGY | Facility: CLINIC | Age: 82
End: 2017-04-12

## 2017-04-12 DIAGNOSIS — R19.00 ABDOMINAL MASS, UNSPECIFIED LOCATION: ICD-10-CM

## 2017-04-12 PROCEDURE — 77412 RADIATION TX DELIVERY LVL 3: CPT | Performed by: RADIOLOGY

## 2017-04-12 PROCEDURE — 77336 RADIATION PHYSICS CONSULT: CPT | Performed by: RADIOLOGY

## 2017-04-12 RX ORDER — HYDROCODONE BITARTRATE AND ACETAMINOPHEN 10; 325 MG/1; MG/1
1 TABLET ORAL EVERY 6 HOURS PRN
Qty: 90 TABLET | Refills: 0 | Status: SHIPPED | OUTPATIENT
Start: 2017-04-12

## 2017-04-12 NOTE — TELEPHONE ENCOUNTER
----- Message from Karol Benavides sent at 4/12/2017  9:21 AM CDT -----  Contact: [phone]/ self   Pt requesting a refill on rx NORCO . Please advise

## 2017-04-13 ENCOUNTER — DOCUMENTATION ONLY (OUTPATIENT)
Dept: HEMATOLOGY/ONCOLOGY | Facility: CLINIC | Age: 82
End: 2017-04-13

## 2017-04-13 RX ORDER — HYDROCODONE BITARTRATE AND ACETAMINOPHEN 10; 325 MG/1; MG/1
1 TABLET ORAL EVERY 6 HOURS PRN
Qty: 90 TABLET | Refills: 0 | Status: SHIPPED | OUTPATIENT
Start: 2017-04-13

## 2024-05-10 NOTE — PROGRESS NOTES
Received call back from Pili with [facility]. She met with the family and patient per their request on 3/28/17 to provide an informational visit. At that time patient did not want to elect for services at that time. She decided to pursue radiation treatment. She is now wanting to pursue hospice services now that she will be finishing her treatments. Faxed order and consult notes to the office of [facility].    done